# Patient Record
Sex: FEMALE | Race: WHITE | Employment: OTHER | URBAN - METROPOLITAN AREA
[De-identification: names, ages, dates, MRNs, and addresses within clinical notes are randomized per-mention and may not be internally consistent; named-entity substitution may affect disease eponyms.]

---

## 2017-07-11 ENCOUNTER — APPOINTMENT (OUTPATIENT)
Dept: GENERAL RADIOLOGY | Facility: HOSPITAL | Age: 47
End: 2017-07-11
Payer: MEDICAID

## 2017-07-11 ENCOUNTER — HOSPITAL ENCOUNTER (EMERGENCY)
Facility: HOSPITAL | Age: 47
Discharge: HOME | End: 2017-07-11
Attending: EMERGENCY MEDICINE
Payer: MEDICAID

## 2017-07-11 VITALS
HEIGHT: 63 IN | HEART RATE: 70 BPM | SYSTOLIC BLOOD PRESSURE: 119 MMHG | TEMPERATURE: 98 F | OXYGEN SATURATION: 99 % | DIASTOLIC BLOOD PRESSURE: 88 MMHG | WEIGHT: 95 LBS | BODY MASS INDEX: 16.83 KG/M2 | RESPIRATION RATE: 16 BRPM

## 2017-07-11 DIAGNOSIS — S93.409A MODERATE ANKLE SPRAIN, UNSPECIFIED LATERALITY, INITIAL ENCOUNTER: Primary | ICD-10-CM

## 2017-07-11 PROCEDURE — 99283 EMERGENCY DEPT VISIT LOW MDM: CPT

## 2017-07-11 PROCEDURE — 73610 X-RAY EXAM OF ANKLE: CPT | Performed by: EMERGENCY MEDICINE

## 2017-07-11 PROCEDURE — 99284 EMERGENCY DEPT VISIT MOD MDM: CPT

## 2017-07-12 NOTE — ED PROVIDER NOTES
Patient Seen in: [facility] Emergency Department    History   Patient presents with:  Fall (musculoskeletal, neurologic)  Ankle Pain    Stated Complaint: fall    HPI    This is a pleasant 19-year-old female who presents to the ER after she injured h Sleep.   diazepam (VALIUM) 5 MG Oral Tab,  Take 5 mg by mouth nightly as needed for Anxiety. No family history on file.     Smoking status: Current Some Day Smoker                                                    Packs/day: 0.00      Years: 0.00 had no evidence of fracture. She will be placed in bilateral ankle splints. Given crutches for ambulation. He was discharged diagnosis of bilateral ankle sprain. Told to continue with icing and elevation. Ibuprofen pain control.   Discharge in good con

## 2017-07-12 NOTE — ED INITIAL ASSESSMENT (HPI)
Bilateral ankle injury sustained 2 days ago after Patient slipped while going down the stairs. No LOC. Duragesic patch applied yesterday AM. Dilaudid 8 mg PO taken at 1500 today.

## 2017-07-17 ENCOUNTER — HOSPITAL ENCOUNTER (OUTPATIENT)
Age: 47
Discharge: HOME | End: 2017-07-17
Payer: MEDICAID

## 2017-07-17 ENCOUNTER — APPOINTMENT (OUTPATIENT)
Dept: GENERAL RADIOLOGY | Age: 47
End: 2017-07-17
Attending: PHYSICIAN ASSISTANT
Payer: MEDICAID

## 2017-07-17 VITALS
BODY MASS INDEX: 16.22 KG/M2 | DIASTOLIC BLOOD PRESSURE: 68 MMHG | HEIGHT: 64 IN | SYSTOLIC BLOOD PRESSURE: 108 MMHG | OXYGEN SATURATION: 97 % | RESPIRATION RATE: 18 BRPM | TEMPERATURE: 99 F | WEIGHT: 95 LBS | HEART RATE: 72 BPM

## 2017-07-17 DIAGNOSIS — S93.401A MILD ANKLE SPRAIN, RIGHT, INITIAL ENCOUNTER: ICD-10-CM

## 2017-07-17 DIAGNOSIS — S93.402D MODERATE ANKLE SPRAIN, LEFT, SUBSEQUENT ENCOUNTER: Primary | ICD-10-CM

## 2017-07-17 PROCEDURE — 99203 OFFICE O/P NEW LOW 30 MIN: CPT

## 2017-07-17 PROCEDURE — 99213 OFFICE O/P EST LOW 20 MIN: CPT

## 2017-07-17 PROCEDURE — 73610 X-RAY EXAM OF ANKLE: CPT | Performed by: PHYSICIAN ASSISTANT

## 2017-07-17 NOTE — ED INITIAL ASSESSMENT (HPI)
Pt presents to the immediate care due to fall. Pt states on July 4th she slipped and fell while walking out of her house into her garage. Pt states she continues to have left ankle pain and discomfort.  Pt states pain medication is helping but doesn't last

## 2017-07-17 NOTE — ED PROVIDER NOTES
Patient Seen in: THE [facility] In [facility]    History   Patient presents with:  Fall (musculoskeletal, neurologic)    Stated Complaint: was seen at the main hospital on the 11th for left ankle pain still hurts and b*    HPI    40 yo female her sucralfate (CARAFATE) 1 GM/10ML Oral Suspension,  Take 1 g by mouth 4 (four) times daily before meals and nightly.     The patient's medication list, past medical history and social history elements  as listed in today's nurse's notes are reviewed and agre Left knee: Normal.        Legs:  Neurological: She is alert and oriented to person, place, and time. She has normal reflexes. Skin: Skin is warm and dry. Psychiatric: She has a normal mood and affect.  Her behavior is normal. Judgment and thought c CONCLUSION:  Lateral and anterior soft tissue swelling, but no signs of fracture, subluxation or other abnormality in the ankle.   Dictated by: Olivier Ingram MD on 7/11/2017 at 22:27     Approved by: Olivier Ingram MD            [facility] (min 3 Views), [address] [id number] [facility] [id number] [phone]    Schedule an appointment as soon as possible for a visit  for F/U or with original Ortho referral      Medications Prescribed:  Current Discharge Medication List

## 2017-09-08 PROBLEM — Z72.0 TOBACCO ABUSE: Status: ACTIVE | Noted: 2017-09-08

## 2017-09-08 PROBLEM — I10 HTN (HYPERTENSION), BENIGN: Status: ACTIVE | Noted: 2017-09-08

## 2017-09-08 PROBLEM — I36.1 NON-RHEUMATIC TRICUSPID VALVE INSUFFICIENCY: Status: ACTIVE | Noted: 2017-09-08

## 2017-09-08 PROBLEM — I72.9 ANEURYSM (HCC): Status: ACTIVE | Noted: 2017-09-08

## 2017-11-10 PROBLEM — R07.2 PRECORDIAL PAIN: Status: ACTIVE | Noted: 2017-11-10

## 2017-11-21 PROBLEM — R06.02 SHORTNESS OF BREATH: Status: ACTIVE | Noted: 2017-11-21

## 2018-12-11 ENCOUNTER — HOSPITAL ENCOUNTER (EMERGENCY)
Age: 48
Discharge: HOME | End: 2018-12-11
Attending: EMERGENCY MEDICINE
Payer: COMMERCIAL

## 2018-12-11 VITALS
OXYGEN SATURATION: 98 % | HEART RATE: 96 BPM | SYSTOLIC BLOOD PRESSURE: 150 MMHG | BODY MASS INDEX: 17 KG/M2 | TEMPERATURE: 98 F | WEIGHT: 95 LBS | RESPIRATION RATE: 16 BRPM | DIASTOLIC BLOOD PRESSURE: 100 MMHG

## 2018-12-11 DIAGNOSIS — K02.9 PAIN DUE TO DENTAL CARIES: Primary | ICD-10-CM

## 2018-12-11 PROCEDURE — 99283 EMERGENCY DEPT VISIT LOW MDM: CPT

## 2018-12-11 RX ORDER — AMOXICILLIN 875 MG/1
875 TABLET, COATED ORAL 2 TIMES DAILY
Qty: 20 TABLET | Refills: 0 | Status: SHIPPED | OUTPATIENT
Start: 2018-12-11 | End: 2018-12-21

## 2018-12-11 RX ORDER — IBUPROFEN 600 MG/1
600 TABLET ORAL ONCE
Status: COMPLETED | OUTPATIENT
Start: 2018-12-11 | End: 2018-12-11

## 2018-12-11 NOTE — ED PROVIDER NOTES
Patient Seen in: THE [facility] Emergency Department In [place]    History   Patient presents with:  Dental Problem (dental)    Stated Complaint: LOWER LEFT DENTAL PAIN    HPI    This is a 53-year-old female complaining of dental pain the patient has pain in this is tender to palpation they have been avulsed at the level of the gumline. There is no surrounding erythema. There is no facial swelling the neck is supple is no nuchal rigidity or lymphadenopathy.   Lungs are clear cardiovascular exam shows regular

## 2019-02-27 ENCOUNTER — HOSPITAL ENCOUNTER (EMERGENCY)
Age: 49
Discharge: HOME | End: 2019-02-27
Attending: EMERGENCY MEDICINE
Payer: COMMERCIAL

## 2019-02-27 VITALS
TEMPERATURE: 98 F | WEIGHT: 95 LBS | RESPIRATION RATE: 18 BRPM | HEART RATE: 102 BPM | OXYGEN SATURATION: 96 % | BODY MASS INDEX: 16.22 KG/M2 | HEIGHT: 64 IN | SYSTOLIC BLOOD PRESSURE: 171 MMHG | DIASTOLIC BLOOD PRESSURE: 89 MMHG

## 2019-02-27 DIAGNOSIS — K04.7 DENTAL ABSCESS: Primary | ICD-10-CM

## 2019-02-27 PROCEDURE — 99283 EMERGENCY DEPT VISIT LOW MDM: CPT

## 2019-02-27 RX ORDER — AMOXICILLIN AND CLAVULANATE POTASSIUM 875; 125 MG/1; MG/1
1 TABLET, FILM COATED ORAL 2 TIMES DAILY
Qty: 20 TABLET | Refills: 0 | Status: SHIPPED | OUTPATIENT
Start: 2019-02-27 | End: 2019-03-09

## 2019-02-28 NOTE — ED PROVIDER NOTES
Patient Seen in: [facility] In [place]    History   Patient presents with:  Dental Problem (dental)    Stated Complaint: DENTAL PAIN    HPI    Patient complains of dental pain. This is the same tooth that had given her issues before. appropriately. Her speech is clear. There is facial symmetry.   No obvious soft tissue swelling or erythema of the face is noted  Eyes: sclera white, conjunctiva pink and moist.  Lids and lashes are normal.  Nose: Unremarkable without purulent nasal secre

## 2019-02-28 NOTE — ED INITIAL ASSESSMENT (HPI)
Pt c/o dental pain. Pt states that she was seen here for the same and was placed on antibiotics.  Pt denies fevers

## 2019-07-26 ENCOUNTER — HOSPITAL ENCOUNTER (EMERGENCY)
Age: 49
Discharge: HOME | End: 2019-07-26
Attending: EMERGENCY MEDICINE
Payer: COMMERCIAL

## 2019-07-26 ENCOUNTER — APPOINTMENT (OUTPATIENT)
Dept: GENERAL RADIOLOGY | Age: 49
End: 2019-07-26
Attending: EMERGENCY MEDICINE
Payer: COMMERCIAL

## 2019-07-26 VITALS
RESPIRATION RATE: 16 BRPM | TEMPERATURE: 98 F | SYSTOLIC BLOOD PRESSURE: 122 MMHG | DIASTOLIC BLOOD PRESSURE: 80 MMHG | BODY MASS INDEX: 16.22 KG/M2 | HEIGHT: 64 IN | WEIGHT: 95 LBS | OXYGEN SATURATION: 99 % | HEART RATE: 74 BPM

## 2019-07-26 DIAGNOSIS — K04.7 DENTAL INFECTION: ICD-10-CM

## 2019-07-26 DIAGNOSIS — S90.32XA CONTUSION OF LEFT FOOT, INITIAL ENCOUNTER: Primary | ICD-10-CM

## 2019-07-26 PROCEDURE — 99283 EMERGENCY DEPT VISIT LOW MDM: CPT

## 2019-07-26 PROCEDURE — 99284 EMERGENCY DEPT VISIT MOD MDM: CPT

## 2019-07-26 PROCEDURE — 73630 X-RAY EXAM OF FOOT: CPT | Performed by: EMERGENCY MEDICINE

## 2019-07-26 RX ORDER — AMOXICILLIN 500 MG/1
500 TABLET, FILM COATED ORAL 3 TIMES DAILY
Qty: 30 TABLET | Refills: 0 | Status: SHIPPED | OUTPATIENT
Start: 2019-07-26 | End: 2019-08-05

## 2019-07-26 RX ORDER — DULOXETIN HYDROCHLORIDE 60 MG/1
60 CAPSULE, DELAYED RELEASE ORAL 2 TIMES DAILY
COMMUNITY

## 2019-07-27 NOTE — ED INITIAL ASSESSMENT (HPI)
Right foot injury, +swelling +bruising, +CMS intact;  Pt stated that a metal dump truck fell on her foot from approx 4 ft

## 2019-07-27 NOTE — ED PROVIDER NOTES
Patient Seen in: THE [facility] Emergency Department In [place]    History   Patient presents with:  Lower Extremity Injury (musculoskeletal)    Stated Complaint: right foot injury    HPI    This is a 28-year-old female that presents with pain to the dorsal a forming left lower tooth#22#23. Surrounding, slightly inflamed. Extremity: Bruising and swelling to the dorsum of the right foot. Minor abrasion. Good range of motion. Good strength with plantar flexion dorsiflexion.     ED Course   Labs Reviewed - No [id number]  [phone]      As needed    dentist    On 7/29/2019          Medications Prescribed:  Current Discharge Medication List    START taking these medications    amoxicillin 500 MG Oral Tab  Take 1 tablet (500 mg total) by mouth 3 (three) times d

## (undated) NOTE — ED AVS SNAPSHOT
Bertrand Luther   MRN: [id number]    Department:  [facility] in [place]   Date of Visit:  7/26/2019           Disclosure     Insurance plans vary and the physician(s) referred by the ER may not be covered by your plan.  Please contact tell this physician (or your personal doctor if your instructions are to return to your personal doctor) about any new or lasting problems. The primary care or specialist physician will see patients referred from the [facility] Emergency Department.  Daniel Becerril

## (undated) NOTE — ED AVS SNAPSHOT
Arelis Newman   MRN: [id number]    Department:  THE [facility] Emergency Department in [place]   Date of Visit:  12/11/2018           Disclosure     Insurance plans vary and the physician(s) referred by the ER may not be covered by your plan.  Please contac tell this physician (or your personal doctor if your instructions are to return to your personal doctor) about any new or lasting problems. The primary care or specialist physician will see patients referred from the [facility] Emergency Department.  Mateo Blanco

## (undated) NOTE — ED AVS SNAPSHOT
Shannon Browning   MRN: [id number]    Department:  [facility] Emergency Department in [place]   Date of Visit:  2/27/2019           Disclosure     Insurance plans vary and the physician(s) referred by the ER may not be covered by your plan.  Please contact tell this physician (or your personal doctor if your instructions are to return to your personal doctor) about any new or lasting problems. The primary care or specialist physician will see patients referred from the [facility] Emergency Department.  Jacky Plata

## (undated) NOTE — ED AVS SNAPSHOT
[facility] Emergency Department  [place]  [address] [address]  Phone:  [phone]  Fax:  [phone]          Alli Malone   MRN: [id number]    Department:  [facility] Emergency Department   Date of Visit:  7/11/2017 CARE PHYSICIAN AT ONCE OR RETURN IMMEDIATELY TO THE EMERGENCY DEPARTMENT.     If you have been prescribed any medication(s), please fill your prescription right away and begin taking the medication(s) as directed    If the emergency physician has read X-ray